# Patient Record
Sex: MALE | Race: WHITE | NOT HISPANIC OR LATINO
[De-identification: names, ages, dates, MRNs, and addresses within clinical notes are randomized per-mention and may not be internally consistent; named-entity substitution may affect disease eponyms.]

---

## 2019-03-20 ENCOUNTER — APPOINTMENT (OUTPATIENT)
Dept: UROLOGY | Facility: CLINIC | Age: 69
End: 2019-03-20
Payer: COMMERCIAL

## 2019-03-20 VITALS
SYSTOLIC BLOOD PRESSURE: 124 MMHG | HEART RATE: 103 BPM | HEIGHT: 74 IN | WEIGHT: 200 LBS | BODY MASS INDEX: 25.67 KG/M2 | DIASTOLIC BLOOD PRESSURE: 77 MMHG

## 2019-03-20 DIAGNOSIS — I10 ESSENTIAL (PRIMARY) HYPERTENSION: ICD-10-CM

## 2019-03-20 DIAGNOSIS — Z78.9 OTHER SPECIFIED HEALTH STATUS: ICD-10-CM

## 2019-03-20 DIAGNOSIS — Z82.49 FAMILY HISTORY OF ISCHEMIC HEART DISEASE AND OTHER DISEASES OF THE CIRCULATORY SYSTEM: ICD-10-CM

## 2019-03-20 DIAGNOSIS — Z86.69 PERSONAL HISTORY OF OTHER DISEASES OF THE NERVOUS SYSTEM AND SENSE ORGANS: ICD-10-CM

## 2019-03-20 PROCEDURE — 99204 OFFICE O/P NEW MOD 45 MIN: CPT

## 2019-03-20 RX ORDER — VALSARTAN AND HYDROCHLOROTHIAZIDE 320; 25 MG/1; MG/1
320-25 TABLET, FILM COATED ORAL
Qty: 90 | Refills: 0 | Status: ACTIVE | COMMUNITY
Start: 2019-03-15

## 2019-03-20 RX ORDER — ASPIRIN 81 MG
81 TABLET, DELAYED RELEASE (ENTERIC COATED) ORAL
Refills: 0 | Status: ACTIVE | COMMUNITY

## 2019-11-14 ENCOUNTER — OUTPATIENT (OUTPATIENT)
Dept: OUTPATIENT SERVICES | Facility: HOSPITAL | Age: 69
LOS: 1 days | Discharge: HOME | End: 2019-11-14

## 2019-11-14 VITALS
HEIGHT: 74 IN | RESPIRATION RATE: 18 BRPM | HEART RATE: 74 BPM | OXYGEN SATURATION: 99 % | SYSTOLIC BLOOD PRESSURE: 122 MMHG | DIASTOLIC BLOOD PRESSURE: 77 MMHG | TEMPERATURE: 97 F | WEIGHT: 199.96 LBS

## 2019-11-14 VITALS
RESPIRATION RATE: 19 BRPM | OXYGEN SATURATION: 100 % | SYSTOLIC BLOOD PRESSURE: 124 MMHG | HEART RATE: 68 BPM | DIASTOLIC BLOOD PRESSURE: 79 MMHG

## 2019-11-14 DIAGNOSIS — Z98.890 OTHER SPECIFIED POSTPROCEDURAL STATES: Chronic | ICD-10-CM

## 2019-11-14 RX ORDER — ONDANSETRON 8 MG/1
4 TABLET, FILM COATED ORAL ONCE
Refills: 0 | Status: DISCONTINUED | OUTPATIENT
Start: 2019-11-14 | End: 2019-11-30

## 2019-11-14 RX ORDER — ACETAMINOPHEN 500 MG
325 TABLET ORAL ONCE
Refills: 0 | Status: DISCONTINUED | OUTPATIENT
Start: 2019-11-14 | End: 2019-11-30

## 2019-11-14 RX ORDER — SODIUM CHLORIDE 9 MG/ML
1000 INJECTION, SOLUTION INTRAVENOUS
Refills: 0 | Status: DISCONTINUED | OUTPATIENT
Start: 2019-11-14 | End: 2019-11-30

## 2019-11-14 NOTE — ASU PATIENT PROFILE, ADULT - PSH
History of surgery  RIGHT EYE CATARACT EXTRACTION WITH IOL, RIGHT EYE DETACHED RETINA REPAIR,CIRCUMCISION

## 2019-11-14 NOTE — ASU PREOP CHECKLIST - NS PREOP CHK CHLOROHEX WASH
Received voicemail from Al regarding the Patient. Al states that, \"we have been playing phone tag and I'd like to get this resolved before the weekend. My number is [phone] and my wife Sarah's number is [phone]. Can someone from Dr. Stallings's office call us as soon as possible.\"   N/A

## 2019-11-25 DIAGNOSIS — I10 ESSENTIAL (PRIMARY) HYPERTENSION: ICD-10-CM

## 2019-11-25 DIAGNOSIS — E78.5 HYPERLIPIDEMIA, UNSPECIFIED: ICD-10-CM

## 2019-11-25 DIAGNOSIS — H25.12 AGE-RELATED NUCLEAR CATARACT, LEFT EYE: ICD-10-CM

## 2020-06-17 ENCOUNTER — APPOINTMENT (OUTPATIENT)
Dept: UROLOGY | Facility: CLINIC | Age: 70
End: 2020-06-17
Payer: COMMERCIAL

## 2020-06-17 VITALS — HEIGHT: 74 IN | BODY MASS INDEX: 25.67 KG/M2 | WEIGHT: 200 LBS | TEMPERATURE: 98.1 F

## 2020-06-17 PROBLEM — Z86.79 PERSONAL HISTORY OF OTHER DISEASES OF THE CIRCULATORY SYSTEM: Chronic | Status: ACTIVE | Noted: 2019-11-14

## 2020-06-17 PROCEDURE — 99214 OFFICE O/P EST MOD 30 MIN: CPT

## 2021-01-08 ENCOUNTER — APPOINTMENT (OUTPATIENT)
Dept: UROLOGY | Facility: CLINIC | Age: 71
End: 2021-01-08
Payer: MEDICARE

## 2021-01-08 VITALS
BODY MASS INDEX: 25.93 KG/M2 | HEART RATE: 80 BPM | HEIGHT: 74 IN | SYSTOLIC BLOOD PRESSURE: 128 MMHG | WEIGHT: 202 LBS | DIASTOLIC BLOOD PRESSURE: 82 MMHG | TEMPERATURE: 98.9 F

## 2021-01-08 PROCEDURE — 99213 OFFICE O/P EST LOW 20 MIN: CPT

## 2021-01-31 ENCOUNTER — TRANSCRIPTION ENCOUNTER (OUTPATIENT)
Age: 71
End: 2021-01-31

## 2021-02-03 ENCOUNTER — TRANSCRIPTION ENCOUNTER (OUTPATIENT)
Age: 71
End: 2021-02-03

## 2021-07-20 ENCOUNTER — APPOINTMENT (OUTPATIENT)
Dept: UROLOGY | Facility: CLINIC | Age: 71
End: 2021-07-20
Payer: MEDICARE

## 2021-07-20 VITALS — HEIGHT: 74 IN | WEIGHT: 198 LBS | BODY MASS INDEX: 25.41 KG/M2

## 2021-07-20 PROCEDURE — 99213 OFFICE O/P EST LOW 20 MIN: CPT

## 2022-02-02 ENCOUNTER — APPOINTMENT (OUTPATIENT)
Dept: UROLOGY | Facility: CLINIC | Age: 72
End: 2022-02-02
Payer: MEDICARE

## 2022-02-02 VITALS — HEIGHT: 74 IN | TEMPERATURE: 97.8 F | BODY MASS INDEX: 25.41 KG/M2 | WEIGHT: 198 LBS

## 2022-02-02 LAB
BILIRUB UR QL STRIP: NORMAL
COLLECTION METHOD: NORMAL
GLUCOSE UR-MCNC: NORMAL
HCG UR QL: 0.2 EU/DL
HGB UR QL STRIP.AUTO: NORMAL
KETONES UR-MCNC: NORMAL
LEUKOCYTE ESTERASE UR QL STRIP: NORMAL
NITRITE UR QL STRIP: NORMAL
PH UR STRIP: 6.5
PROT UR STRIP-MCNC: NORMAL
SP GR UR STRIP: 1.02

## 2022-02-02 PROCEDURE — 99213 OFFICE O/P EST LOW 20 MIN: CPT

## 2022-02-02 PROCEDURE — 81003 URINALYSIS AUTO W/O SCOPE: CPT | Mod: QW

## 2022-02-02 NOTE — ASSESSMENT
[FreeTextEntry1] : #1. BPH \par #2. Elevated  PSA- \par #3. LUTS - minimal voiding symptoms\par \par Plan:\par -Discussed PSA results with the patient. \par -Discussed the trends in variable PSA values.\par -Discussed extending PSA testing to annual - patient desires 6 month testing.\par -Discussed TRUS - what it is, what it shows.\par -TRUS.\par -PSA 6 months.\par -RTO 6 months.

## 2022-02-02 NOTE — HISTORY OF PRESENT ILLNESS
[Urinary Frequency] : urinary frequency [Post-Void Dribbling] : post-void dribbling [None] : None [FreeTextEntry1] : 71 year old male presents to the office for a follow up of BPH. He reports feeling well. He does not have any new urinary symptoms to report. He denies any fever, nausea, and other constitutional symptoms.  \par \par All past and present data reviewed:\par 01/2022  PSA= 4.0\par 07/2021  PSA= 2.4  \par 12/2020  PSA = 4.0  \par 06/2020  PSA = 4.8  **** verbal from PCP [Little]\par 03/2019  PSA = 3.3\par \par 02/2022 UA= neg [Urinary Urgency] : no urinary urgency [Nocturia] : no nocturia [Weak Stream] : no weak stream [Dysuria] : no dysuria [Hematuria - Gross] : no gross hematuria [Fever] : no fever [Fatigue] : no fatigue [Nausea] : no nausea [Anorexia] : no anorexia

## 2022-02-02 NOTE — PHYSICAL EXAM
[General Appearance - Well Developed] : well developed [General Appearance - Well Nourished] : well nourished [Normal Appearance] : normal appearance [Well Groomed] : well groomed [General Appearance - In No Acute Distress] : no acute distress [Abdomen Soft] : soft [Abdomen Tenderness] : non-tender [Costovertebral Angle Tenderness] : no ~M costovertebral angle tenderness [Urethral Meatus] : meatus normal [Penis Abnormality] : normal circumcised penis [Urinary Bladder Findings] : the bladder was normal on palpation [Scrotum] : the scrotum was normal [Testes Tenderness] : no tenderness of the testes [Testes Mass (___cm)] : there were no testicular masses [Prostate Tenderness] : the prostate was not tender [No Prostate Nodules] : no prostate nodules [Prostate Size ___ gm] : prostate size [unfilled] gm [Skin Color & Pigmentation] : normal skin color and pigmentation [Edema] : no peripheral edema [] : no respiratory distress [Respiration, Rhythm And Depth] : normal respiratory rhythm and effort [Exaggerated Use Of Accessory Muscles For Inspiration] : no accessory muscle use [Oriented To Time, Place, And Person] : oriented to person, place, and time [Affect] : the affect was normal [Mood] : the mood was normal [Not Anxious] : not anxious [Normal Station and Gait] : the gait and station were normal for the patient's age [No Focal Deficits] : no focal deficits [No Palpable Adenopathy] : no palpable adenopathy [FreeTextEntry1] : sulcus pos [last visit]

## 2022-02-02 NOTE — END OF VISIT
[FreeTextEntry3] : Patient notes was transcribed by leonid Skinner under the supervision of Dr. Goyal.\par And I have   reviewed the patient's chart and agree that it alines  with my medical decisions.\par

## 2022-07-28 ENCOUNTER — APPOINTMENT (OUTPATIENT)
Dept: ORTHOPEDIC SURGERY | Facility: CLINIC | Age: 72
End: 2022-07-28

## 2022-08-09 ENCOUNTER — APPOINTMENT (OUTPATIENT)
Dept: UROLOGY | Facility: CLINIC | Age: 72
End: 2022-08-09

## 2022-08-09 PROCEDURE — 76872 US TRANSRECTAL: CPT

## 2022-08-19 ENCOUNTER — APPOINTMENT (OUTPATIENT)
Dept: ORTHOPEDIC SURGERY | Facility: CLINIC | Age: 72
End: 2022-08-19

## 2022-08-19 PROCEDURE — 99213 OFFICE O/P EST LOW 20 MIN: CPT

## 2022-08-19 NOTE — ASSESSMENT
[FreeTextEntry1] :  doing well slow but sure heat continue the stretching therapy Advil if he wants will see him back in a few months for discharge no reason for an MRI

## 2022-08-19 NOTE — IMAGING
[de-identified] :  normal gait hip any motion full negative Homans sign negative Resendez test minimal tenderness on the Achilles good strength no swelling or edema
28-Jun-2018 01:00

## 2022-08-19 NOTE — HISTORY OF PRESENT ILLNESS
[de-identified] : History of Present Illness\par Mr. NEREIDA BAZZI, a 71-year-old male, presents today for evaluation pain to the right foot and ankle, patient\par states that he has been having pain over his right Achillis.\par Patient states that on April of this year he was in [place] and he was doing a lot of walking, and he started noticing pain\par on the back of his foot on about the Achillis.\par Patient also noticed that he had less strength to push of with his right foot.\par Patient denies any trauma or any injury.\par Patient states that in the morning he has no pain, the pain is mainly with prolonged walking

## 2022-08-19 NOTE — REASON FOR VISIT
[FreeTextEntry2] : follow up for left foot pain Pain in the Achilles is feeling better he has done  10 visit therapy feels about 50% better  feels stronger\par  is back doing some walking

## 2022-09-14 ENCOUNTER — APPOINTMENT (OUTPATIENT)
Dept: UROLOGY | Facility: CLINIC | Age: 72
End: 2022-09-14

## 2022-09-14 VITALS
DIASTOLIC BLOOD PRESSURE: 81 MMHG | SYSTOLIC BLOOD PRESSURE: 119 MMHG | HEART RATE: 80 BPM | BODY MASS INDEX: 25.67 KG/M2 | HEIGHT: 74 IN | WEIGHT: 200 LBS

## 2022-09-14 PROCEDURE — 99214 OFFICE O/P EST MOD 30 MIN: CPT

## 2022-11-22 ENCOUNTER — APPOINTMENT (OUTPATIENT)
Dept: ORTHOPEDIC SURGERY | Facility: CLINIC | Age: 72
End: 2022-11-22

## 2022-11-22 DIAGNOSIS — M76.61 ACHILLES TENDINITIS, RIGHT LEG: ICD-10-CM

## 2022-11-22 PROCEDURE — 99213 OFFICE O/P EST LOW 20 MIN: CPT

## 2022-11-22 NOTE — HISTORY OF PRESENT ILLNESS
[de-identified] : History of Present Illness\par Mr. NEREIDA BAZZI, a 71-year-old male, presents today for evaluation pain to the right foot and ankle, patient\par states that he has been having pain over his right Achillis.\par Patient states that on April of this year he was in [place] and he was doing a lot of walking, and he started noticing pain\par on the back of his foot on about the Achillis.\par Patient also noticed that he had less strength to push of with his right foot.\par Patient denies any trauma or any injury.\par Patient states that in the morning he has no pain, the pain is mainly with prolonged walking

## 2022-11-22 NOTE — IMAGING
[de-identified] :  normal gait hip any motion full negative Homans sign negative Resendez test minimal tenderness on the Achilles good strength no swelling or edema

## 2022-11-22 NOTE — ASSESSMENT
[FreeTextEntry1] :  doing well slow but sure ice/ heat continue the stretching   can finishtherapy Advil if he wants will see him  p.r.n.

## 2022-12-30 ENCOUNTER — APPOINTMENT (OUTPATIENT)
Dept: ORTHOPEDIC SURGERY | Facility: CLINIC | Age: 72
End: 2022-12-30
Payer: MEDICARE

## 2022-12-30 PROCEDURE — 20610 DRAIN/INJ JOINT/BURSA W/O US: CPT | Mod: RT

## 2022-12-30 PROCEDURE — 73562 X-RAY EXAM OF KNEE 3: CPT | Mod: RT

## 2022-12-30 PROCEDURE — 99213 OFFICE O/P EST LOW 20 MIN: CPT | Mod: 25

## 2022-12-30 NOTE — DISCUSSION/SUMMARY
[de-identified] : 72-year-old male with right knee osteoarthritis and likely degenerative meniscal tearing.  I discussed treatment options with the patient and he would like a cortisone injection as he has had success with this in the past.  This was provided for him see procedure note.  Patient can follow-up as needed.

## 2022-12-30 NOTE — DATA REVIEWED
[FreeTextEntry1] : Right knee x-ray AP lateral and oblique obtained in the office which demonstrate patellofemoral and medial compartment arthritis.  No fractures.

## 2022-12-30 NOTE — PHYSICAL EXAM
[de-identified] : Right knee: No instability with valgus or varus stress negative Lachman negative posterior drawer no joint effusion.  Some mild joint line tenderness.  No tenderness over the superior pole of the patella intact straight leg raise.

## 2022-12-30 NOTE — PROCEDURE
[Right] : of the right [Knee] : knee [Pain] : pain [Inflammation] : inflammation [X-ray evidence of Osteoarthritis on this or prior visit] : x-ray evidence of Osteoarthritis on this or prior visit [Alcohol] : alcohol [___ cc    1%] : Lidocaine ~Vcc of 1%  [___ cc    4mg] : Dexamethasone (Decadron) ~Vcc of 4 mg  [Call if redness, pain or fever occur] : call if redness, pain or fever occur [Apply ice for 15min out of every hour for the next 12-24 hours as tolerated] : apply ice for 15 minutes out of every hour for the next 12-24 hours as tolerated [Previous OTC use and PT nontherapeutic] : patient has tried OTC's including aspirin, Ibuprofen, Aleve, etc or prescription NSAIDS, and/or exercises at home and/or physical therapy without satisfactory response [Patient had decreased mobility in the joint] : patient had decreased mobility in the joint [Risks, benefits, alternatives discussed / Verbal consent obtained] : the risks benefits, and alternatives have been discussed, and verbal consent was obtained

## 2022-12-30 NOTE — HISTORY OF PRESENT ILLNESS
[de-identified] : 72-year-old male presents with 1 to 2 months of right knee pain.  The patient just finished physical therapy for 6 weeks denies and soon after developed right knee pain.  The pain is a constant deep ache.  It is the especially after he is driving or been sitting for a long time.  Is very uncomfortable for him.  In the past he has had pain like this and has had injections which have relieved the pain for years.  He is interested in other injection.  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

## 2023-02-20 NOTE — ASU PREOP CHECKLIST - PATIENT'S PERSONAL PROPERTY GIVEN TO
Increase fluid/water intake   Tylenol as needed for fever, headache     Follow up in one week if symptoms persist sooner if symptoms worsen        on unit/[facility]

## 2023-03-22 ENCOUNTER — APPOINTMENT (OUTPATIENT)
Dept: UROLOGY | Facility: CLINIC | Age: 73
End: 2023-03-22
Payer: MEDICARE

## 2023-03-22 VITALS
SYSTOLIC BLOOD PRESSURE: 128 MMHG | HEIGHT: 74 IN | DIASTOLIC BLOOD PRESSURE: 81 MMHG | WEIGHT: 200 LBS | BODY MASS INDEX: 25.67 KG/M2 | HEART RATE: 98 BPM

## 2023-03-22 DIAGNOSIS — Z86.03 PERSONAL HISTORY OF NEOPLASM OF UNCERTAIN BEHAVIOR: ICD-10-CM

## 2023-03-22 DIAGNOSIS — Z00.00 ENCOUNTER FOR GENERAL ADULT MEDICAL EXAMINATION W/OUT ABNORMAL FINDINGS: ICD-10-CM

## 2023-03-22 LAB
BILIRUB UR QL STRIP: NORMAL
COLLECTION METHOD: NORMAL
GLUCOSE UR-MCNC: NORMAL
HCG UR QL: 0.2 EU/DL
HGB UR QL STRIP.AUTO: NORMAL
KETONES UR-MCNC: NORMAL
LEUKOCYTE ESTERASE UR QL STRIP: NORMAL
NITRITE UR QL STRIP: NORMAL
PH UR STRIP: 5.5
PROT UR STRIP-MCNC: NORMAL
SP GR UR STRIP: 1.03

## 2023-03-22 PROCEDURE — 99214 OFFICE O/P EST MOD 30 MIN: CPT

## 2023-03-27 ENCOUNTER — APPOINTMENT (OUTPATIENT)
Dept: ORTHOPEDIC SURGERY | Facility: CLINIC | Age: 73
End: 2023-03-27
Payer: MEDICARE

## 2023-03-27 PROCEDURE — 99213 OFFICE O/P EST LOW 20 MIN: CPT

## 2023-03-27 RX ORDER — HYLAN G-F 20 16MG/2ML
16 SYRINGE (ML) INTRAARTICULAR
Qty: 1 | Refills: 0 | Status: ACTIVE | OUTPATIENT
Start: 2023-03-27

## 2023-03-27 NOTE — HISTORY OF PRESENT ILLNESS
[de-identified] : Patient is a 82-year-old male here for follow-up evaluation right knee osteoarthritis.  Patient was was last seen in December and given a cortisone injection to the right knee.  Patient states cortisone injection only helped for a couple weeks and then the pain came back.  Denies recent injury/trauma, numbness/tingling, instability.  Patient states pain worsens with weather/activity.

## 2023-03-27 NOTE — DISCUSSION/SUMMARY
[de-identified] : Discussed prior x-rays with patient showing moderate osteoarthritis of right knee with medial joint narrowing.  Discussed treatment options detail including ice/heat, rest, range of motion exercises, physical therapy, Tylenol, cortisone injection, gel injection.  Patient interested in gel injection right knee.  Synvisc 3 injections ordered for authorization.  Patient will follow-up for reevaluation/injections.  Call with any questions or concerns.  Patient understands agrees with plan.  Seen in supervision of Dr. stanley

## 2023-03-27 NOTE — PHYSICAL EXAM
[Right] : right knee [Negative] : negative Iwona's [] : light touch is intact throughout [FreeTextEntry9] : Full range of motion with mild discomfort medial joint line [de-identified] : Good strength throughout

## 2023-05-12 ENCOUNTER — APPOINTMENT (OUTPATIENT)
Dept: ORTHOPEDIC SURGERY | Facility: CLINIC | Age: 73
End: 2023-05-12
Payer: MEDICARE

## 2023-05-12 PROCEDURE — 20610 DRAIN/INJ JOINT/BURSA W/O US: CPT | Mod: RT

## 2023-05-12 PROCEDURE — 99213 OFFICE O/P EST LOW 20 MIN: CPT | Mod: 25

## 2023-05-12 NOTE — HISTORY OF PRESENT ILLNESS
[de-identified] : Patient is a 72-year-old male here for reevaluation of right knee osteoarthritis and for Synvisc injection right knee.  Denies change of symptoms.

## 2023-05-12 NOTE — PHYSICAL EXAM
[Right] : right knee [Negative] : negative Iwona's [] : light touch is intact throughout [FreeTextEntry9] : Full range of motion with mild discomfort medial joint line [de-identified] : Good strength throughout

## 2023-05-12 NOTE — DISCUSSION/SUMMARY
[de-identified] : Plan was for Synvisc injection right knee.\par \par An injection of Synvisc was injected into right knee after verbal consent using sterile technique. The risks, benefits, and alternatives to Visco-supplementation injection were explained in full to the patient. Risks outlined include but are not limited to infection, sepsis, bleeding, scarring, skin discoloration, temporary increase in pain, syncopal episode, failure to resolve symptoms, allergic reaction, and symptom recurrence. Signs and symptoms of infection reviewed, and patients advised to call immediately for redness, fevers, and/or chills. Patient understood the risks. All questions were answered. Sterile technique was used without complications. The patient tolerated the procedure well. Ice tonight to the injection site. \par \par We will follow-up in 1 week for second injection.  Call if any questions or concerns.  Patient understands agrees with plan.\par \par

## 2023-05-19 ENCOUNTER — APPOINTMENT (OUTPATIENT)
Dept: ORTHOPEDIC SURGERY | Facility: CLINIC | Age: 73
End: 2023-05-19
Payer: MEDICARE

## 2023-05-19 VITALS — BODY MASS INDEX: 25.67 KG/M2 | WEIGHT: 200 LBS | HEIGHT: 74 IN

## 2023-05-19 PROCEDURE — 20610 DRAIN/INJ JOINT/BURSA W/O US: CPT | Mod: RT

## 2023-05-19 NOTE — DISCUSSION/SUMMARY
[de-identified] : Plan was for Synvisc injection right knee.\par \par An injection of Synvisc was injected into right knee after verbal consent using sterile technique. The risks, benefits, and alternatives to Visco-supplementation injection were explained in full to the patient. Risks outlined include but are not limited to infection, sepsis, bleeding, scarring, skin discoloration, temporary increase in pain, syncopal episode, failure to resolve symptoms, allergic reaction, and symptom recurrence. Signs and symptoms of infection reviewed, and patients advised to call immediately for redness, fevers, and/or chills. Patient understood the risks. All questions were answered. Sterile technique was used without complications. The patient tolerated the procedure well. Ice tonight to the injection site. \par \par We will follow-up in 1 week for third injection.  Call if any questions or concerns.  Patient understands agrees with plan.\par \par

## 2023-05-19 NOTE — HISTORY OF PRESENT ILLNESS
[de-identified] : Patient is a 72-year-old male here for second Synvisc injection right knee.  Denies adverse reactions with first injection.

## 2023-05-26 ENCOUNTER — APPOINTMENT (OUTPATIENT)
Dept: ORTHOPEDIC SURGERY | Facility: CLINIC | Age: 73
End: 2023-05-26
Payer: MEDICARE

## 2023-05-26 PROCEDURE — 20610 DRAIN/INJ JOINT/BURSA W/O US: CPT | Mod: RT

## 2023-05-26 NOTE — DISCUSSION/SUMMARY
[de-identified] : Plan was for Synvisc injection right knee.\par \par An injection of Synvisc was injected into right knee after verbal consent using sterile technique. The risks, benefits, and alternatives to Visco-supplementation injection were explained in full to the patient. Risks outlined include but are not limited to infection, sepsis, bleeding, scarring, skin discoloration, temporary increase in pain, syncopal episode, failure to resolve symptoms, allergic reaction, and symptom recurrence. Signs and symptoms of infection reviewed, and patients advised to call immediately for redness, fevers, and/or chills. Patient understood the risks. All questions were answered. Sterile technique was used without complications. The patient tolerated the procedure well. Ice tonight to the injection site. \par \par We will follow-up in 4 months .  Call if any questions or concerns.  Patient understands agrees with plan.\par \par

## 2023-05-26 NOTE — HISTORY OF PRESENT ILLNESS
[de-identified] : Patient is a 72-year-old male here for third Synvisc injection right knee.  Denies adverse reactions with first injection.

## 2023-09-26 ENCOUNTER — APPOINTMENT (OUTPATIENT)
Dept: ORTHOPEDIC SURGERY | Facility: CLINIC | Age: 73
End: 2023-09-26
Payer: MEDICARE

## 2023-09-26 PROCEDURE — 99213 OFFICE O/P EST LOW 20 MIN: CPT

## 2023-10-04 ENCOUNTER — APPOINTMENT (OUTPATIENT)
Dept: UROLOGY | Facility: CLINIC | Age: 73
End: 2023-10-04
Payer: MEDICARE

## 2023-10-04 VITALS
RESPIRATION RATE: 16 BRPM | HEART RATE: 89 BPM | DIASTOLIC BLOOD PRESSURE: 78 MMHG | BODY MASS INDEX: 25.67 KG/M2 | HEIGHT: 74 IN | WEIGHT: 200 LBS | OXYGEN SATURATION: 98 % | TEMPERATURE: 97.6 F | SYSTOLIC BLOOD PRESSURE: 125 MMHG

## 2023-10-04 DIAGNOSIS — R97.20 ELEVATED PROSTATE, SPECIFIC ANTIGEN [PSA]: ICD-10-CM

## 2023-10-04 DIAGNOSIS — N40.1 BENIGN PROSTATIC HYPERPLASIA WITH LOWER URINARY TRACT SYMPMS: ICD-10-CM

## 2023-10-04 DIAGNOSIS — R39.9 UNSPECIFIED SYMPTOMS AND SIGNS INVOLVING THE GENITOURINARY SYSTEM: ICD-10-CM

## 2023-10-04 DIAGNOSIS — N13.8 BENIGN PROSTATIC HYPERPLASIA WITH LOWER URINARY TRACT SYMPMS: ICD-10-CM

## 2023-10-04 LAB
BILIRUB UR QL STRIP: NORMAL
COLLECTION METHOD: NORMAL
GLUCOSE UR-MCNC: NORMAL
HCG UR QL: 0.2 EU/DL
HGB UR QL STRIP.AUTO: NORMAL
KETONES UR-MCNC: NORMAL
LEUKOCYTE ESTERASE UR QL STRIP: NORMAL
NITRITE UR QL STRIP: NORMAL
PH UR STRIP: 5.5
PROT UR STRIP-MCNC: NORMAL
SP GR UR STRIP: 1.02

## 2023-10-04 PROCEDURE — 99214 OFFICE O/P EST MOD 30 MIN: CPT

## 2024-04-03 ENCOUNTER — APPOINTMENT (OUTPATIENT)
Dept: UROLOGY | Facility: CLINIC | Age: 74
End: 2024-04-03
Payer: MEDICARE

## 2024-04-03 ENCOUNTER — RESULT CHARGE (OUTPATIENT)
Age: 74
End: 2024-04-03

## 2024-04-03 VITALS
BODY MASS INDEX: 26.05 KG/M2 | DIASTOLIC BLOOD PRESSURE: 78 MMHG | SYSTOLIC BLOOD PRESSURE: 118 MMHG | TEMPERATURE: 97.2 F | OXYGEN SATURATION: 99 % | HEIGHT: 74 IN | HEART RATE: 92 BPM | WEIGHT: 203 LBS

## 2024-04-03 PROCEDURE — 99213 OFFICE O/P EST LOW 20 MIN: CPT

## 2024-04-03 PROCEDURE — G2211 COMPLEX E/M VISIT ADD ON: CPT

## 2024-04-03 NOTE — HISTORY OF PRESENT ILLNESS
[Urinary Frequency] : urinary frequency [Post-Void Dribbling] : post-void dribbling [None] : None [FreeTextEntry1] : 73 year old male presents to the office for a follow up of BPH and elevated/variable PSA. He denies dysuria, hematuria, fever, nausea, or other constitutional symptoms.   Urinary pattern remains unchanged  03/2024  PSA= 3.8   PSAD-  0.02 09/2023 PSA= 3.8   PSAD= 0.02 03/2023 PSA= 4.5    %fpsa= 24    4K= ND   PSAD= 0.03 07/2022 PSA= 4.9    PSAD= 0.03 01/2022 PSA= 4.0 07/2021 PSA= 2.4   12/2020 PSA= 4.0   06/2020 PSA= 4.8     **** verbal from PCP [Little] 03/2019 PSA= 3.3  02/2022 UA= neg 03/2023 UA= neg 10/2023 UA= neg 4/2024- Udip- neg  08/2022 TRUS= 133 gm, no nodules [Urinary Urgency] : no urinary urgency [Nocturia] : no nocturia [Weak Stream] : no weak stream [Dysuria] : no dysuria [Hematuria - Gross] : no gross hematuria [Fever] : no fever [Nausea] : no nausea

## 2024-04-03 NOTE — PHYSICAL EXAM
[General Appearance - Well Developed] : well developed [General Appearance - Well Nourished] : well nourished [Normal Appearance] : normal appearance [Well Groomed] : well groomed [General Appearance - In No Acute Distress] : no acute distress PAST SURGICAL HISTORY:  History of cardiac cath     History of total left knee replacement     History of vaginal surgery     S/P tubal ligation      [Abdomen Soft] : soft [Abdomen Tenderness] : non-tender [Costovertebral Angle Tenderness] : no ~M costovertebral angle tenderness [Urethral Meatus] : meatus normal [Penis Abnormality] : normal circumcised penis [Urinary Bladder Findings] : the bladder was normal on palpation [Scrotum] : the scrotum was normal [Testes Tenderness] : no tenderness of the testes [Testes Mass (___cm)] : there were no testicular masses [Prostate Tenderness] : the prostate was not tender [No Prostate Nodules] : no prostate nodules [Prostate Size ___ gm] : prostate size [unfilled] gm [Skin Color & Pigmentation] : normal skin color and pigmentation [] : no respiratory distress [Respiration, Rhythm And Depth] : normal respiratory rhythm and effort [Exaggerated Use Of Accessory Muscles For Inspiration] : no accessory muscle use [Oriented To Time, Place, And Person] : oriented to person, place, and time [Affect] : the affect was normal [Mood] : the mood was normal [Not Anxious] : not anxious [Normal Station and Gait] : the gait and station were normal for the patient's age [No Focal Deficits] : no focal deficits [FreeTextEntry1] : sulcus pos [last visit]

## 2024-04-03 NOTE — ASSESSMENT
[FreeTextEntry1] : 1. BPH -- 133 gm 2. Variable PSA -, nl PSAD- stable from last visit 3. LUTS -- minimal voiding symptoms 4. bladder ca - by history, s/p TURBT, intravesical therapy ~1985   Plan: -Discussed PSA results,PSAD and PSA variability- also discussed prostate MRI if PSA rises  -PSA 6 months -rto 6 months  total time spent with encounter including face to face time with patient and review of chart and plan totaled 25 minutes

## 2024-04-26 ENCOUNTER — APPOINTMENT (OUTPATIENT)
Dept: ORTHOPEDIC SURGERY | Facility: CLINIC | Age: 74
End: 2024-04-26
Payer: MEDICARE

## 2024-04-26 PROCEDURE — 99213 OFFICE O/P EST LOW 20 MIN: CPT

## 2024-04-26 RX ORDER — HYALURONATE SODIUM 20 MG/2 ML
20 SYRINGE (ML) INTRAARTICULAR
Qty: 3 | Refills: 0 | Status: ACTIVE | OUTPATIENT
Start: 2024-04-26

## 2024-04-26 NOTE — PHYSICAL EXAM
[Right] : right knee [Negative] : negative Iwona's [] : light touch is intact throughout [de-identified] : Good strength throughout [FreeTextEntry9] : Full range of motion with mild discomfort medial joint line

## 2024-04-26 NOTE — HISTORY OF PRESENT ILLNESS
[de-identified] : Patient is a 73-year-old male here for follow-up evaluation of right knee osteoarthritis.  Patient had viscosupplementation injection about a year ago to the right knee.  Patient states over the past couple months his right knee pain has been worsening, injections have been wearing out with no injury or trauma.  Patient consider repeat injections

## 2024-04-26 NOTE — DISCUSSION/SUMMARY
[de-identified] : Reviewed prior imaging in detail with patient.  Plan is for repeat viscosupplementation ejections right knee.  Euflexxa injections ordered.  Patient will follow-up for injections.  Call if symptoms worsen or change.

## 2024-06-07 ENCOUNTER — APPOINTMENT (OUTPATIENT)
Dept: ORTHOPEDIC SURGERY | Facility: CLINIC | Age: 74
End: 2024-06-07
Payer: MEDICARE

## 2024-06-07 PROCEDURE — 99213 OFFICE O/P EST LOW 20 MIN: CPT | Mod: 25

## 2024-06-07 PROCEDURE — 20610 DRAIN/INJ JOINT/BURSA W/O US: CPT | Mod: RT

## 2024-06-07 NOTE — PHYSICAL EXAM
[Right] : right knee [Negative] : negative Iwona's [] : light touch is intact throughout [FreeTextEntry9] : Full range of motion with mild discomfort medial joint line [de-identified] : Good strength throughout

## 2024-06-07 NOTE — DISCUSSION/SUMMARY
[de-identified] : An injection of Synvisc was injected into right knee after verbal consent using sterile technique. The risks, benefits, and alternatives to Visco-supplementation injection were explained in full to the patient. Risks outlined include but are not limited to infection, sepsis, bleeding, scarring, skin discoloration, temporary increase in pain, syncopal episode, failure to resolve symptoms, allergic reaction, and symptom recurrence. Signs and symptoms of infection reviewed, and patients advised to call immediately for redness, fevers, and/or chills. Patient understood the risks. All questions were answered. Sterile technique was used without complications. The patient tolerated the procedure well. Ice tonight to the injection site.        We will follow-up in 1 week for second injection.  Call if any questions or concerns.  Patient understands agrees with plan.

## 2024-06-14 ENCOUNTER — APPOINTMENT (OUTPATIENT)
Dept: ORTHOPEDIC SURGERY | Facility: CLINIC | Age: 74
End: 2024-06-14
Payer: MEDICARE

## 2024-06-14 PROCEDURE — 20610 DRAIN/INJ JOINT/BURSA W/O US: CPT | Mod: RT

## 2024-06-14 NOTE — DISCUSSION/SUMMARY
[de-identified] : Right knee second Synvisc gel injection  HPI Patient is a 73-year old male reports to office for subsequent reevaluation of knee pain.  Here to receive gel injection.  He tolerated last week's injection well.  Assessment/plan With the patient's approval, the right knee second Synvisc injection was performed in the office today.  See the attached procedure note for further details.  Explained to the patient that the full effect of the medication may take up to 6 weeks for it to kick in.  The patient was advised to rest/ice the area and can alternate with warm compresses.  Instructed not to do any strenuous activity that would further aggravate their symptoms.  Patient will follow-up in 1 week for third and final injection. all of the patient's questions/concerns were answered in detail.

## 2024-06-21 ENCOUNTER — APPOINTMENT (OUTPATIENT)
Dept: ORTHOPEDIC SURGERY | Facility: CLINIC | Age: 74
End: 2024-06-21
Payer: MEDICARE

## 2024-06-21 DIAGNOSIS — M17.11 UNILATERAL PRIMARY OSTEOARTHRITIS, RIGHT KNEE: ICD-10-CM

## 2024-06-21 PROCEDURE — 20610 DRAIN/INJ JOINT/BURSA W/O US: CPT | Mod: RT

## 2024-06-21 NOTE — DISCUSSION/SUMMARY
[de-identified] : An injection of Synvisc was injected into right knee after verbal consent using sterile technique. The risks, benefits, and alternatives to Visco-supplementation injection were explained in full to the patient. Risks outlined include but are not limited to infection, sepsis, bleeding, scarring, skin discoloration, temporary increase in pain, syncopal episode, failure to resolve symptoms, allergic reaction, and symptom recurrence. Signs and symptoms of infection reviewed, and patients advised to call immediately for redness, fevers, and/or chills. Patient understood the risks. All questions were answered. Sterile technique was used without complications. The patient tolerated the procedure well. Ice tonight to the injection site.        Follow-up in 4 to 6 months for reevaluation call if any questions or concerns.  Patient understands agrees with plan.

## 2024-06-21 NOTE — HISTORY OF PRESENT ILLNESS
[de-identified] : Patient is a 73-year-old male here for follow-up evaluation of right knee osteoarthritis.  Plan is for Synvisc injection

## 2024-10-08 ENCOUNTER — NON-APPOINTMENT (OUTPATIENT)
Age: 74
End: 2024-10-08

## 2024-10-08 ENCOUNTER — APPOINTMENT (OUTPATIENT)
Dept: UROLOGY | Facility: CLINIC | Age: 74
End: 2024-10-08
Payer: MEDICARE

## 2024-10-08 VITALS
SYSTOLIC BLOOD PRESSURE: 115 MMHG | HEIGHT: 74 IN | BODY MASS INDEX: 26.31 KG/M2 | WEIGHT: 205 LBS | TEMPERATURE: 97.3 F | HEART RATE: 82 BPM | RESPIRATION RATE: 16 BRPM | DIASTOLIC BLOOD PRESSURE: 75 MMHG | OXYGEN SATURATION: 98 %

## 2024-10-08 PROCEDURE — 99213 OFFICE O/P EST LOW 20 MIN: CPT

## 2024-10-08 PROCEDURE — G2211 COMPLEX E/M VISIT ADD ON: CPT

## 2024-10-15 ENCOUNTER — OUTPATIENT (OUTPATIENT)
Dept: OUTPATIENT SERVICES | Facility: HOSPITAL | Age: 74
LOS: 1 days | End: 2024-10-15
Payer: MEDICARE

## 2024-10-15 DIAGNOSIS — R97.20 ELEVATED PROSTATE SPECIFIC ANTIGEN [PSA]: ICD-10-CM

## 2024-10-15 DIAGNOSIS — Z98.890 OTHER SPECIFIED POSTPROCEDURAL STATES: Chronic | ICD-10-CM

## 2024-10-15 DIAGNOSIS — Z00.8 ENCOUNTER FOR OTHER GENERAL EXAMINATION: ICD-10-CM

## 2024-10-15 PROCEDURE — 72197 MRI PELVIS W/O & W/DYE: CPT | Mod: 26,MH

## 2024-10-15 PROCEDURE — 72197 MRI PELVIS W/O & W/DYE: CPT

## 2024-10-15 PROCEDURE — A9579: CPT

## 2024-10-16 ENCOUNTER — NON-APPOINTMENT (OUTPATIENT)
Age: 74
End: 2024-10-16

## 2024-10-16 DIAGNOSIS — R97.20 ELEVATED PROSTATE SPECIFIC ANTIGEN [PSA]: ICD-10-CM

## 2024-10-23 ENCOUNTER — APPOINTMENT (OUTPATIENT)
Dept: UROLOGY | Facility: CLINIC | Age: 74
End: 2024-10-23
Payer: MEDICARE

## 2024-10-23 VITALS — HEART RATE: 87 BPM | SYSTOLIC BLOOD PRESSURE: 125 MMHG | TEMPERATURE: 98 F | DIASTOLIC BLOOD PRESSURE: 89 MMHG

## 2024-10-23 DIAGNOSIS — R97.20 ELEVATED PROSTATE, SPECIFIC ANTIGEN [PSA]: ICD-10-CM

## 2024-10-23 DIAGNOSIS — N40.1 BENIGN PROSTATIC HYPERPLASIA WITH LOWER URINARY TRACT SYMPMS: ICD-10-CM

## 2024-10-23 DIAGNOSIS — N13.8 BENIGN PROSTATIC HYPERPLASIA WITH LOWER URINARY TRACT SYMPMS: ICD-10-CM

## 2024-10-23 DIAGNOSIS — G96.191 PERINEURAL CYST: ICD-10-CM

## 2024-10-23 DIAGNOSIS — R39.9 UNSPECIFIED SYMPTOMS AND SIGNS INVOLVING THE GENITOURINARY SYSTEM: ICD-10-CM

## 2024-10-23 DIAGNOSIS — N40.2 NODULAR PROSTATE W/OUT LOWER URINARY TRACT SYMPTOMS: ICD-10-CM

## 2024-10-23 PROCEDURE — 99214 OFFICE O/P EST MOD 30 MIN: CPT

## 2024-10-23 PROCEDURE — G2211 COMPLEX E/M VISIT ADD ON: CPT

## 2024-11-07 RX ORDER — TAMSULOSIN HYDROCHLORIDE 0.4 MG/1
0.4 CAPSULE ORAL
Qty: 30 | Refills: 5 | Status: ACTIVE | COMMUNITY
Start: 2024-11-07 | End: 1900-01-01

## 2024-11-20 ENCOUNTER — OUTPATIENT (OUTPATIENT)
Dept: OUTPATIENT SERVICES | Facility: HOSPITAL | Age: 74
LOS: 1 days | End: 2024-11-20
Payer: MEDICARE

## 2024-11-20 VITALS
SYSTOLIC BLOOD PRESSURE: 115 MMHG | OXYGEN SATURATION: 97 % | DIASTOLIC BLOOD PRESSURE: 87 MMHG | HEIGHT: 74 IN | RESPIRATION RATE: 16 BRPM | HEART RATE: 77 BPM | TEMPERATURE: 98 F | WEIGHT: 205.03 LBS

## 2024-11-20 DIAGNOSIS — Z98.49 CATARACT EXTRACTION STATUS, UNSPECIFIED EYE: Chronic | ICD-10-CM

## 2024-11-20 DIAGNOSIS — Z01.818 ENCOUNTER FOR OTHER PREPROCEDURAL EXAMINATION: ICD-10-CM

## 2024-11-20 DIAGNOSIS — Z98.890 OTHER SPECIFIED POSTPROCEDURAL STATES: Chronic | ICD-10-CM

## 2024-11-20 DIAGNOSIS — R97.20 ELEVATED PROSTATE SPECIFIC ANTIGEN [PSA]: ICD-10-CM

## 2024-11-20 LAB
ALBUMIN SERPL ELPH-MCNC: 4.6 G/DL — SIGNIFICANT CHANGE UP (ref 3.5–5.2)
ALP SERPL-CCNC: 68 U/L — SIGNIFICANT CHANGE UP (ref 30–115)
ALT FLD-CCNC: 35 U/L — SIGNIFICANT CHANGE UP (ref 0–41)
ANION GAP SERPL CALC-SCNC: 10 MMOL/L — SIGNIFICANT CHANGE UP (ref 7–14)
ANISOCYTOSIS BLD QL: SIGNIFICANT CHANGE UP
APPEARANCE UR: CLEAR — SIGNIFICANT CHANGE UP
AST SERPL-CCNC: 23 U/L — SIGNIFICANT CHANGE UP (ref 0–41)
BASOPHILS # BLD AUTO: 0.07 K/UL — SIGNIFICANT CHANGE UP (ref 0–0.2)
BASOPHILS NFR BLD AUTO: 1 % — SIGNIFICANT CHANGE UP (ref 0–1)
BILIRUB SERPL-MCNC: 0.9 MG/DL — SIGNIFICANT CHANGE UP (ref 0.2–1.2)
BILIRUB UR-MCNC: NEGATIVE — SIGNIFICANT CHANGE UP
BUN SERPL-MCNC: 16 MG/DL — SIGNIFICANT CHANGE UP (ref 10–20)
CALCIUM SERPL-MCNC: 9.8 MG/DL — SIGNIFICANT CHANGE UP (ref 8.4–10.5)
CHLORIDE SERPL-SCNC: 101 MMOL/L — SIGNIFICANT CHANGE UP (ref 98–110)
CO2 SERPL-SCNC: 28 MMOL/L — SIGNIFICANT CHANGE UP (ref 17–32)
COLOR SPEC: YELLOW — SIGNIFICANT CHANGE UP
CREAT SERPL-MCNC: 1 MG/DL — SIGNIFICANT CHANGE UP (ref 0.7–1.5)
DIFF PNL FLD: NEGATIVE — SIGNIFICANT CHANGE UP
EGFR: 79 ML/MIN/1.73M2 — SIGNIFICANT CHANGE UP
EOSINOPHIL # BLD AUTO: 0.24 K/UL — SIGNIFICANT CHANGE UP (ref 0–0.7)
EOSINOPHIL NFR BLD AUTO: 3.6 % — SIGNIFICANT CHANGE UP (ref 0–8)
GLUCOSE SERPL-MCNC: 94 MG/DL — SIGNIFICANT CHANGE UP (ref 70–99)
GLUCOSE UR QL: NEGATIVE MG/DL — SIGNIFICANT CHANGE UP
HCT VFR BLD CALC: 44.1 % — SIGNIFICANT CHANGE UP (ref 42–52)
HGB BLD-MCNC: 14.4 G/DL — SIGNIFICANT CHANGE UP (ref 14–18)
IMM GRANULOCYTES NFR BLD AUTO: 0.3 % — SIGNIFICANT CHANGE UP (ref 0.1–0.3)
KETONES UR-MCNC: NEGATIVE MG/DL — SIGNIFICANT CHANGE UP
LEUKOCYTE ESTERASE UR-ACNC: NEGATIVE — SIGNIFICANT CHANGE UP
LYMPHOCYTES # BLD AUTO: 2.27 K/UL — SIGNIFICANT CHANGE UP (ref 1.2–3.4)
LYMPHOCYTES # BLD AUTO: 33.6 % — SIGNIFICANT CHANGE UP (ref 20.5–51.1)
MANUAL SMEAR VERIFICATION: SIGNIFICANT CHANGE UP
MCHC RBC-ENTMCNC: 22.5 PG — LOW (ref 27–31)
MCHC RBC-ENTMCNC: 32.7 G/DL — SIGNIFICANT CHANGE UP (ref 32–37)
MCV RBC AUTO: 68.9 FL — LOW (ref 80–94)
MICROCYTES BLD QL: SLIGHT — SIGNIFICANT CHANGE UP
MONOCYTES # BLD AUTO: 0.76 K/UL — HIGH (ref 0.1–0.6)
MONOCYTES NFR BLD AUTO: 11.3 % — HIGH (ref 1.7–9.3)
NEUTROPHILS # BLD AUTO: 3.39 K/UL — SIGNIFICANT CHANGE UP (ref 1.4–6.5)
NEUTROPHILS NFR BLD AUTO: 50.2 % — SIGNIFICANT CHANGE UP (ref 42.2–75.2)
NITRITE UR-MCNC: NEGATIVE — SIGNIFICANT CHANGE UP
NRBC # BLD: 0 /100 WBCS — SIGNIFICANT CHANGE UP (ref 0–0)
OVALOCYTES BLD QL SMEAR: SLIGHT — SIGNIFICANT CHANGE UP
PH UR: 6.5 — SIGNIFICANT CHANGE UP (ref 5–8)
PLAT MORPH BLD: SIGNIFICANT CHANGE UP
PLATELET # BLD AUTO: 204 K/UL — SIGNIFICANT CHANGE UP (ref 130–400)
PMV BLD: 12.1 FL — HIGH (ref 7.4–10.4)
POTASSIUM SERPL-MCNC: 3.8 MMOL/L — SIGNIFICANT CHANGE UP (ref 3.5–5)
POTASSIUM SERPL-SCNC: 3.8 MMOL/L — SIGNIFICANT CHANGE UP (ref 3.5–5)
PROT SERPL-MCNC: 7.3 G/DL — SIGNIFICANT CHANGE UP (ref 6–8)
PROT UR-MCNC: NEGATIVE MG/DL — SIGNIFICANT CHANGE UP
RBC # BLD: 6.4 M/UL — HIGH (ref 4.7–6.1)
RBC # FLD: 16.9 % — HIGH (ref 11.5–14.5)
RBC BLD AUTO: NORMAL — SIGNIFICANT CHANGE UP
SODIUM SERPL-SCNC: 139 MMOL/L — SIGNIFICANT CHANGE UP (ref 135–146)
SP GR SPEC: 1.01 — SIGNIFICANT CHANGE UP (ref 1–1.03)
UROBILINOGEN FLD QL: 0.2 MG/DL — SIGNIFICANT CHANGE UP (ref 0.2–1)
WBC # BLD: 6.75 K/UL — SIGNIFICANT CHANGE UP (ref 4.8–10.8)
WBC # FLD AUTO: 6.75 K/UL — SIGNIFICANT CHANGE UP (ref 4.8–10.8)

## 2024-11-20 PROCEDURE — 85025 COMPLETE CBC W/AUTO DIFF WBC: CPT

## 2024-11-20 PROCEDURE — 87086 URINE CULTURE/COLONY COUNT: CPT

## 2024-11-20 PROCEDURE — 36415 COLL VENOUS BLD VENIPUNCTURE: CPT

## 2024-11-20 PROCEDURE — 80053 COMPREHEN METABOLIC PANEL: CPT

## 2024-11-20 PROCEDURE — 93010 ELECTROCARDIOGRAM REPORT: CPT

## 2024-11-20 PROCEDURE — 93005 ELECTROCARDIOGRAM TRACING: CPT

## 2024-11-20 PROCEDURE — 99214 OFFICE O/P EST MOD 30 MIN: CPT | Mod: 25

## 2024-11-20 PROCEDURE — 81003 URINALYSIS AUTO W/O SCOPE: CPT

## 2024-11-20 NOTE — H&P PST ADULT - REASON FOR ADMISSION
Case Type: OP  Suite: [facility]  Proceduralist: Enrrique Goyal  Confirmed Surgery Date Time: 12- -  PAST Date Time: 11- - 11:15  Procedure: MRI FUSION TRANSPERINEAL PROSTATE BIOPSY  Laterality: N/A  Length of Procedure: 30 Minutes  Anesthesia Type: General

## 2024-11-20 NOTE — H&P PST ADULT - NSICDXPASTSURGICALHX_GEN_ALL_CORE_FT
PAST SURGICAL HISTORY:  H/O cataract removal with insertion of prosthetic lens     H/O detached retina repair

## 2024-11-20 NOTE — H&P PST ADULT - HISTORY OF PRESENT ILLNESS
Patient is a 73 yo male who presents to pretesting for the preparations of the above surgery due to elevated PSA of 4.6 and MRI of prostate shown prostate lesion.   Patient denies any cp, sob, palpitations, fever, cough, URI, abdominal pains, N/V, UTI, hematuria or frequency in urination, rashes or open wounds.  As per patient exercise tolerance of 1-2 fos walks with out sob  Patient denies any s/s covid 19 and reports no contact with known positive people.   Anesthesia Alert  NO--Difficult Airway  NO--History of neck surgery or radiation  NO--Limited ROM of neck  NO--History of Malignant hyperthermia  NO--Personal or family history of Pseudocholinesterase deficiency  NO--Prior Anesthesia Complication  NO--Latex Allergy  NO--Loose teeth  NO--History of Rheumatoid Arthritis  NO--KRYSTA  NO--Risk of bleedings   Pt instructed to stop vitamins/supplements/herbal medications for one week prior to surgery  As per patient this is the complete medical, surgical history and medications.  Duke Activity Status Index (DASI) from [employer]  on 11/20/2024  ** All calculations should be rechecked by clinician prior to use **  RESULT SUMMARY:  50.7 points  The higher the score (maximum 58.2), the higher the functional status.  8.97 METs  INPUTS:  Take care of self —> 2.75 = Yes  Walk indoors —> 1.75 = Yes  Walk 1&ndash;2 blocks on level ground —> 2.75 = Yes  Climb a flight of stairs or walk up a hill —> 5.5 = Yes  Run a short distance —> 8 = Yes  Do light work around the house —> 2.7 = Yes  Do moderate work around the house —> 3.5 = Yes  Do heavy work around the house —> 8 = Yes  Do yardwork —> 4.5 = Yes  Have sexual relations —> 5.25 = Yes  Participate in moderate recreational activities —> 6 = Yes  Participate in strenuous sports —> 0 = No  Revised Cardiac Risk Index for Pre-Operative Risk from [employer]  on 11/20/2024  ** All calculations should be rechecked by clinician prior to use **  RESULT SUMMARY:  1 points  RCRI Score  6.0 %  Risk of major cardiac event  INPUTS:  High-risk surgery —> 1 = Yes  History of ischemic heart disease —> 0 = No  History of congestive heart failure —> 0 = No  History of cerebrovascular disease —> 0 = No  Pre-operative treatment with insulin —> 0 = No  Pre-operative creatinine >2 mg/dL / 176.8 µmol/L —> 0 = No

## 2024-11-20 NOTE — H&P PST ADULT - NSICDXFAMILYHX_GEN_ALL_CORE_FT
FAMILY HISTORY:  Father  Still living? No  FH: colon cancer, Age at diagnosis: Age Unknown    Mother  Still living? Unknown  Family history of bicuspid aortic valve, Age at diagnosis: Age Unknown    Sibling  Still living? No  Family history of bicuspid aortic valve, Age at diagnosis: Age Unknown

## 2024-11-21 DIAGNOSIS — Z01.818 ENCOUNTER FOR OTHER PREPROCEDURAL EXAMINATION: ICD-10-CM

## 2024-11-21 DIAGNOSIS — R97.20 ELEVATED PROSTATE SPECIFIC ANTIGEN [PSA]: ICD-10-CM

## 2024-11-21 LAB
CULTURE RESULTS: SIGNIFICANT CHANGE UP
SPECIMEN SOURCE: SIGNIFICANT CHANGE UP

## 2024-12-02 ENCOUNTER — NON-APPOINTMENT (OUTPATIENT)
Age: 74
End: 2024-12-02

## 2024-12-02 ENCOUNTER — RESULT REVIEW (OUTPATIENT)
Age: 74
End: 2024-12-02

## 2024-12-02 ENCOUNTER — TRANSCRIPTION ENCOUNTER (OUTPATIENT)
Age: 74
End: 2024-12-02

## 2024-12-02 ENCOUNTER — OUTPATIENT (OUTPATIENT)
Dept: OUTPATIENT SERVICES | Facility: HOSPITAL | Age: 74
LOS: 1 days | Discharge: HOME | End: 2024-12-02
Payer: MEDICARE

## 2024-12-02 ENCOUNTER — APPOINTMENT (OUTPATIENT)
Dept: UROLOGY | Facility: HOSPITAL | Age: 74
End: 2024-12-02

## 2024-12-02 VITALS
RESPIRATION RATE: 18 BRPM | WEIGHT: 205.03 LBS | OXYGEN SATURATION: 99 % | DIASTOLIC BLOOD PRESSURE: 99 MMHG | HEART RATE: 83 BPM | TEMPERATURE: 99 F | SYSTOLIC BLOOD PRESSURE: 138 MMHG | HEIGHT: 74 IN

## 2024-12-02 VITALS — RESPIRATION RATE: 19 BRPM | DIASTOLIC BLOOD PRESSURE: 93 MMHG | HEART RATE: 76 BPM | SYSTOLIC BLOOD PRESSURE: 149 MMHG

## 2024-12-02 DIAGNOSIS — R97.20 ELEVATED PROSTATE SPECIFIC ANTIGEN [PSA]: ICD-10-CM

## 2024-12-02 DIAGNOSIS — Z98.890 OTHER SPECIFIED POSTPROCEDURAL STATES: Chronic | ICD-10-CM

## 2024-12-02 DIAGNOSIS — E78.5 HYPERLIPIDEMIA, UNSPECIFIED: ICD-10-CM

## 2024-12-02 DIAGNOSIS — I10 ESSENTIAL (PRIMARY) HYPERTENSION: ICD-10-CM

## 2024-12-02 DIAGNOSIS — Z98.49 CATARACT EXTRACTION STATUS, UNSPECIFIED EYE: Chronic | ICD-10-CM

## 2024-12-02 PROCEDURE — 55700: CPT

## 2024-12-02 PROCEDURE — G0416: CPT | Mod: 26

## 2024-12-02 PROCEDURE — 76999F: CUSTOM | Mod: 26

## 2024-12-02 PROCEDURE — G0416: CPT

## 2024-12-02 RX ORDER — HYDROMORPHONE HYDROCHLORIDE 2 MG/1
0.5 TABLET ORAL
Refills: 0 | Status: DISCONTINUED | OUTPATIENT
Start: 2024-12-02 | End: 2024-12-02

## 2024-12-02 RX ORDER — CYST/ALA/Q10/PHOS.SER/DHA/BROC 100-20-50
1 POWDER (GRAM) ORAL
Refills: 0 | DISCHARGE

## 2024-12-02 RX ORDER — ACETAMINOPHEN 500MG 500 MG/1
1000 TABLET, COATED ORAL ONCE
Refills: 0 | Status: COMPLETED | OUTPATIENT
Start: 2024-12-02 | End: 2024-12-02

## 2024-12-02 RX ORDER — 0.9 % SODIUM CHLORIDE 0.9 %
1000 INTRAVENOUS SOLUTION INTRAVENOUS
Refills: 0 | Status: DISCONTINUED | OUTPATIENT
Start: 2024-12-02 | End: 2024-12-02

## 2024-12-02 RX ORDER — MULTIVIT WITH MINERALS/LUTEIN
2 TABLET ORAL
Refills: 0 | DISCHARGE

## 2024-12-02 RX ORDER — TAMSULOSIN HYDROCHLORIDE 0.4 MG/1
1 CAPSULE ORAL
Refills: 0 | DISCHARGE

## 2024-12-02 RX ORDER — CHOLECALCIFEROL (VITAMIN D3) 10MCG/0.25
1 DROPS ORAL
Refills: 0 | DISCHARGE

## 2024-12-02 RX ORDER — KETOROLAC TROMETHAMINE 30 MG/ML
30 INJECTION INTRAMUSCULAR; INTRAVENOUS ONCE
Refills: 0 | Status: DISCONTINUED | OUTPATIENT
Start: 2024-12-02 | End: 2024-12-02

## 2024-12-02 RX ORDER — ONDANSETRON HYDROCHLORIDE 4 MG/1
4 TABLET, FILM COATED ORAL ONCE
Refills: 0 | Status: DISCONTINUED | OUTPATIENT
Start: 2024-12-02 | End: 2024-12-02

## 2024-12-02 RX ORDER — METOCLOPRAMIDE HYDROCHLORIDE 10 MG/1
10 TABLET ORAL ONCE
Refills: 0 | Status: DISCONTINUED | OUTPATIENT
Start: 2024-12-02 | End: 2024-12-02

## 2024-12-02 RX ORDER — VALSARTAN AND HYDROCHLOROTHIAZIDE 320; 12.5 MG/1; MG/1
1 TABLET, FILM COATED ORAL
Refills: 0 | DISCHARGE

## 2024-12-02 RX ORDER — CYANOCOBALAMIN/FOLIC AC/VIT B6 1-2.2-25MG
1 TABLET ORAL
Refills: 0 | DISCHARGE

## 2024-12-02 RX ORDER — ROSUVASTATIN CALCIUM 5 MG/1
1 TABLET, FILM COATED ORAL
Refills: 0 | DISCHARGE

## 2024-12-02 RX ADMIN — ACETAMINOPHEN 500MG 400 MILLIGRAM(S): 500 TABLET, COATED ORAL at 15:45

## 2024-12-02 RX ADMIN — ACETAMINOPHEN 500MG 1000 MILLIGRAM(S): 500 TABLET, COATED ORAL at 16:00

## 2024-12-02 RX ADMIN — KETOROLAC TROMETHAMINE 30 MILLIGRAM(S): 30 INJECTION INTRAMUSCULAR; INTRAVENOUS at 16:15

## 2024-12-02 RX ADMIN — Medication 75 MILLILITER(S): at 16:00

## 2024-12-02 RX ADMIN — KETOROLAC TROMETHAMINE 30 MILLIGRAM(S): 30 INJECTION INTRAMUSCULAR; INTRAVENOUS at 16:02

## 2024-12-02 NOTE — ASU PATIENT PROFILE, ADULT - PRESSURE ULCER(S)
Refill Routing Note   Medication(s) are not appropriate for processing by [facility] for the following reason(s):        Non-participating provider:     [facility] action(s):  Route      Medication Therapy Plan:         Appointments  past 12m or future 3m with PCP    Date Provider   Last Visit   5/26/2023 Vilma Greer NP   Next Visit   2/14/2024 Vilma Greer, NP   ED visits in past 90 days: 0        Note composed:3:49 PM 12/19/2023           
no

## 2024-12-02 NOTE — BRIEF OPERATIVE NOTE - NSICDXBRIEFPROCEDURE_GEN_ALL_CORE_FT
PROCEDURES:  Biopsy, prostate, with integrated MRI-transrectal US guidance 02-Dec-2024 15:50:49  Enrrique LOBO

## 2024-12-02 NOTE — CHART NOTE - NSCHARTNOTEFT_GEN_A_CORE
PACU ANESTHESIA ADMISSION NOTE      Procedure: prostate biopsy          __x__  Patent Airway    __x__  Full return of protective reflexes    __x__  Full recovery from anesthesia / back to baseline status    Vitals:  T(C): 37 (12-02-24 @ 13:00), Max: 37 (12-02-24 @ 10:58)  HR: 83 (12-02-24 @ 13:00) (83 - 83)  BP: 138/99 (12-02-24 @ 13:00) (138/99 - 138/99)  RR: 18 (12-02-24 @ 13:00) (18 - 18)  SpO2: 99% (12-02-24 @ 13:00) (99% - 99%)    Mental Status:  __x__ Awake   ___x__ Alert   _____ Drowsy   _____ Sedated    Nausea/Vomiting:  __x__ NO  ______Yes,   See Post - Op Orders          Pain Scale (0-10):  _____    Treatment: ____ None    __x__ See Post - Op/PCA Orders    Post - Operative Fluids:   ____ Oral   __x__ See Post - Op Orders    Plan: Discharge:   _x___Home       _____Floor     _____Critical Care    _____  Other:_________________    Comments: Patient had smooth intraoperative event, no anesthesia complication. Vital signs stable, Report given to RN

## 2024-12-02 NOTE — ASU PATIENT PROFILE, ADULT - NSALCOHOLFREQ_GEN_A_CORE_SD
[Dear  ___] : Dear  [unfilled], [Consult Letter:] : I had the pleasure of evaluating your patient, [unfilled]. [Please see my note below.] : Please see my note below. monthly or less [Consult Closing:] : Thank you very much for allowing me to participate in the care of this patient.  If you have any questions, please do not hesitate to contact me. [Sincerely,] : Sincerely, [FreeTextEntry2] : SONIA ELAM MD\par  [FreeTextEntry3] : Wilfrido Florentino MD\par Chief of Joint Replacement\par [facility] Knee Replacement \par [facility]\par \par

## 2024-12-02 NOTE — ASU DISCHARGE PLAN (ADULT/PEDIATRIC) - ASU DC SPECIAL INSTRUCTIONSFT
expect some blood in urine/ semen x couple weeks   expect some pain   resume any other meds   call officer now for appt in 2 weeks   no ABT

## 2024-12-02 NOTE — ASU DISCHARGE PLAN (ADULT/PEDIATRIC) - NS MD DC FALL RISK RISK
For information on Fall & Injury Prevention, visit: [email or web address] OR  [email or web address] OR  https://www.cdc.gov/steadi/patient.html

## 2024-12-02 NOTE — ASU PATIENT PROFILE, ADULT - FALL HARM RISK - HARM RISK INTERVENTIONS

## 2024-12-02 NOTE — ASU DISCHARGE PLAN (ADULT/PEDIATRIC) - FINANCIAL ASSISTANCE
[facility] provides services at a reduced cost to those who are determined to be eligible through [facility]’s financial assistance program. Information regarding [facility]’s financial assistance program can be found by going to [email or web address] or by calling [phone].

## 2024-12-03 ENCOUNTER — NON-APPOINTMENT (OUTPATIENT)
Age: 74
End: 2024-12-03

## 2024-12-05 PROBLEM — E78.5 HYPERLIPIDEMIA, UNSPECIFIED: Chronic | Status: ACTIVE | Noted: 2024-11-20

## 2024-12-12 LAB — SURGICAL PATHOLOGY STUDY: SIGNIFICANT CHANGE UP

## 2024-12-18 ENCOUNTER — APPOINTMENT (OUTPATIENT)
Dept: UROLOGY | Facility: CLINIC | Age: 74
End: 2024-12-18
Payer: MEDICARE

## 2024-12-18 VITALS
DIASTOLIC BLOOD PRESSURE: 94 MMHG | BODY MASS INDEX: 26.31 KG/M2 | SYSTOLIC BLOOD PRESSURE: 131 MMHG | TEMPERATURE: 98.1 F | OXYGEN SATURATION: 94 % | HEIGHT: 74 IN | WEIGHT: 205 LBS | RESPIRATION RATE: 17 BRPM | HEART RATE: 87 BPM

## 2024-12-18 DIAGNOSIS — N13.8 BENIGN PROSTATIC HYPERPLASIA WITH LOWER URINARY TRACT SYMPMS: ICD-10-CM

## 2024-12-18 DIAGNOSIS — R39.9 UNSPECIFIED SYMPTOMS AND SIGNS INVOLVING THE GENITOURINARY SYSTEM: ICD-10-CM

## 2024-12-18 DIAGNOSIS — R97.20 ELEVATED PROSTATE, SPECIFIC ANTIGEN [PSA]: ICD-10-CM

## 2024-12-18 DIAGNOSIS — N40.1 BENIGN PROSTATIC HYPERPLASIA WITH LOWER URINARY TRACT SYMPMS: ICD-10-CM

## 2024-12-18 LAB
BILIRUB UR QL STRIP: NORMAL
COLLECTION METHOD: NORMAL
GLUCOSE UR-MCNC: NORMAL
HCG UR QL: 1 EU/DL
HGB UR QL STRIP.AUTO: NORMAL
KETONES UR-MCNC: NORMAL
LEUKOCYTE ESTERASE UR QL STRIP: NORMAL
NITRITE UR QL STRIP: NORMAL
PH UR STRIP: 7
PROT UR STRIP-MCNC: NORMAL
SP GR UR STRIP: 1.01

## 2024-12-18 PROCEDURE — G2211 COMPLEX E/M VISIT ADD ON: CPT

## 2024-12-18 PROCEDURE — 99214 OFFICE O/P EST MOD 30 MIN: CPT

## 2025-02-26 NOTE — HISTORY OF PRESENT ILLNESS
Strep/covid/flu/rsv - all negative    Z pack x 5 days    Use humidifier or steam inhalations  Salt water gargles  May use Tylenol or Ibuprofen for pain comfort fever  May use OTC cough suppressant syrup  May use throat lozenges  Increase oral fluid intake  Avoid caffeine, alcohol, smoking  Frequent hand washing  Flonase or Nasacort nasal spray - 2 sprays in each nostril once daily  F/u with PCP in 2-3 days if symptoms persist    For any send out test results, please note the following: If your results are positive you will receive a phone call from us.  If your result is negative, we will not call you.  Also, your results will be posted to your [employer] jaja.    [de-identified] : Patient is a 73-year-old male here for follow-up evaluation of right knee osteoarthritis.  Plan is for physical injection

## 2025-04-08 ENCOUNTER — APPOINTMENT (OUTPATIENT)
Dept: UROLOGY | Facility: CLINIC | Age: 75
End: 2025-04-08

## 2025-05-09 ENCOUNTER — RX RENEWAL (OUTPATIENT)
Age: 75
End: 2025-05-09

## 2025-06-17 ENCOUNTER — APPOINTMENT (OUTPATIENT)
Dept: UROLOGY | Facility: CLINIC | Age: 75
End: 2025-06-17
Payer: MEDICARE

## 2025-06-17 VITALS
OXYGEN SATURATION: 97 % | BODY MASS INDEX: 26.31 KG/M2 | WEIGHT: 205 LBS | HEIGHT: 74 IN | HEART RATE: 94 BPM | SYSTOLIC BLOOD PRESSURE: 98 MMHG | DIASTOLIC BLOOD PRESSURE: 66 MMHG

## 2025-06-17 LAB
BILIRUB UR QL STRIP: NORMAL
COLLECTION METHOD: NORMAL
GLUCOSE UR-MCNC: NORMAL
HCG UR QL: NORMAL EU/DL
HGB UR QL STRIP.AUTO: NORMAL
KETONES UR-MCNC: NORMAL
LEUKOCYTE ESTERASE UR QL STRIP: NORMAL
NITRITE UR QL STRIP: NORMAL
PH UR STRIP: 6
PROT UR STRIP-MCNC: NORMAL
SP GR UR STRIP: 1.02

## 2025-06-17 PROCEDURE — G2211 COMPLEX E/M VISIT ADD ON: CPT

## 2025-06-17 PROCEDURE — 99214 OFFICE O/P EST MOD 30 MIN: CPT
